# Patient Record
Sex: FEMALE | Race: WHITE | NOT HISPANIC OR LATINO | URBAN - METROPOLITAN AREA
[De-identification: names, ages, dates, MRNs, and addresses within clinical notes are randomized per-mention and may not be internally consistent; named-entity substitution may affect disease eponyms.]

---

## 2019-12-22 ENCOUNTER — INPATIENT (INPATIENT)
Facility: HOSPITAL | Age: 19
LOS: 2 days | Discharge: HOME | End: 2019-12-25
Attending: INTERNAL MEDICINE | Admitting: INTERNAL MEDICINE
Payer: COMMERCIAL

## 2019-12-22 VITALS — HEART RATE: 121 BPM | WEIGHT: 104.94 LBS | OXYGEN SATURATION: 100 % | TEMPERATURE: 98 F | RESPIRATION RATE: 24 BRPM

## 2019-12-22 LAB
HCT VFR BLD CALC: 39 % — SIGNIFICANT CHANGE UP (ref 37–47)
HGB BLD-MCNC: 12.3 G/DL — SIGNIFICANT CHANGE UP (ref 12–16)
MCHC RBC-ENTMCNC: 25.9 PG — LOW (ref 27–31)
MCHC RBC-ENTMCNC: 31.5 G/DL — LOW (ref 32–37)
MCV RBC AUTO: 82.3 FL — SIGNIFICANT CHANGE UP (ref 81–99)
NRBC # BLD: 0 /100 WBCS — SIGNIFICANT CHANGE UP (ref 0–0)
PLATELET # BLD AUTO: 195 K/UL — SIGNIFICANT CHANGE UP (ref 130–400)
RBC # BLD: 4.74 M/UL — SIGNIFICANT CHANGE UP (ref 4.2–5.4)
RBC # FLD: 13 % — SIGNIFICANT CHANGE UP (ref 11.5–14.5)
WBC # BLD: 4.36 K/UL — LOW (ref 4.8–10.8)
WBC # FLD AUTO: 4.36 K/UL — LOW (ref 4.8–10.8)

## 2019-12-22 PROCEDURE — 99291 CRITICAL CARE FIRST HOUR: CPT

## 2019-12-22 RX ORDER — IPRATROPIUM/ALBUTEROL SULFATE 18-103MCG
3 AEROSOL WITH ADAPTER (GRAM) INHALATION ONCE
Refills: 0 | Status: COMPLETED | OUTPATIENT
Start: 2019-12-22 | End: 2019-12-22

## 2019-12-22 RX ORDER — DEXAMETHASONE 0.5 MG/5ML
10 ELIXIR ORAL ONCE
Refills: 0 | Status: COMPLETED | OUTPATIENT
Start: 2019-12-22 | End: 2019-12-22

## 2019-12-22 RX ORDER — EPINEPHRINE 0.3 MG/.3ML
0.3 INJECTION INTRAMUSCULAR; SUBCUTANEOUS ONCE
Refills: 0 | Status: DISCONTINUED | OUTPATIENT
Start: 2019-12-22 | End: 2019-12-23

## 2019-12-23 LAB
ALBUMIN SERPL ELPH-MCNC: 4.6 G/DL — SIGNIFICANT CHANGE UP (ref 3.5–5.2)
ALP SERPL-CCNC: 52 U/L — SIGNIFICANT CHANGE UP (ref 30–115)
ALT FLD-CCNC: 10 U/L — LOW (ref 14–37)
AMPHET UR-MCNC: NEGATIVE — SIGNIFICANT CHANGE UP
ANION GAP SERPL CALC-SCNC: 12 MMOL/L — SIGNIFICANT CHANGE UP (ref 7–14)
ANION GAP SERPL CALC-SCNC: 13 MMOL/L — SIGNIFICANT CHANGE UP (ref 7–14)
ANION GAP SERPL CALC-SCNC: 17 MMOL/L — HIGH (ref 7–14)
AST SERPL-CCNC: 19 U/L — SIGNIFICANT CHANGE UP (ref 14–37)
BARBITURATES UR SCN-MCNC: NEGATIVE — SIGNIFICANT CHANGE UP
BASOPHILS # BLD AUTO: 0 K/UL — SIGNIFICANT CHANGE UP (ref 0–0.2)
BASOPHILS # BLD AUTO: 0 K/UL — SIGNIFICANT CHANGE UP (ref 0–0.2)
BASOPHILS NFR BLD AUTO: 0 % — SIGNIFICANT CHANGE UP (ref 0–1)
BASOPHILS NFR BLD AUTO: 0 % — SIGNIFICANT CHANGE UP (ref 0–1)
BENZODIAZ UR-MCNC: POSITIVE
BILIRUB SERPL-MCNC: 0.3 MG/DL — SIGNIFICANT CHANGE UP (ref 0.2–1.2)
BUN SERPL-MCNC: 5 MG/DL — LOW (ref 10–20)
BUN SERPL-MCNC: 5 MG/DL — LOW (ref 10–20)
BUN SERPL-MCNC: 7 MG/DL — LOW (ref 10–20)
BURR CELLS BLD QL SMEAR: PRESENT — SIGNIFICANT CHANGE UP
CA-I SERPL-SCNC: 1.15 MMOL/L — SIGNIFICANT CHANGE UP (ref 1.12–1.3)
CALCIUM SERPL-MCNC: 7.9 MG/DL — LOW (ref 8.5–10.1)
CALCIUM SERPL-MCNC: 8.2 MG/DL — LOW (ref 8.5–10.1)
CALCIUM SERPL-MCNC: 9.2 MG/DL — SIGNIFICANT CHANGE UP (ref 8.5–10.1)
CHLORIDE SERPL-SCNC: 103 MMOL/L — SIGNIFICANT CHANGE UP (ref 98–110)
CHLORIDE SERPL-SCNC: 108 MMOL/L — SIGNIFICANT CHANGE UP (ref 98–110)
CHLORIDE SERPL-SCNC: 98 MMOL/L — SIGNIFICANT CHANGE UP (ref 98–110)
CO2 SERPL-SCNC: 19 MMOL/L — SIGNIFICANT CHANGE UP (ref 17–32)
CO2 SERPL-SCNC: 20 MMOL/L — SIGNIFICANT CHANGE UP (ref 17–32)
CO2 SERPL-SCNC: 20 MMOL/L — SIGNIFICANT CHANGE UP (ref 17–32)
COCAINE METAB.OTHER UR-MCNC: NEGATIVE — SIGNIFICANT CHANGE UP
CREAT SERPL-MCNC: 0.5 MG/DL — LOW (ref 0.7–1.5)
CREAT SERPL-MCNC: 0.6 MG/DL — LOW (ref 0.7–1.5)
CREAT SERPL-MCNC: 0.6 MG/DL — LOW (ref 0.7–1.5)
ELLIPTOCYTES BLD QL SMEAR: SLIGHT — SIGNIFICANT CHANGE UP
EOSINOPHIL # BLD AUTO: 0 K/UL — SIGNIFICANT CHANGE UP (ref 0–0.7)
EOSINOPHIL NFR BLD AUTO: 0 % — SIGNIFICANT CHANGE UP (ref 0–8)
EOSINOPHIL NFR BLD AUTO: 0 % — SIGNIFICANT CHANGE UP (ref 0–8)
GAS PNL BLDA: SIGNIFICANT CHANGE UP
GAS PNL BLDV: 140 MMOL/L — SIGNIFICANT CHANGE UP (ref 136–145)
GAS PNL BLDV: SIGNIFICANT CHANGE UP
GLUCOSE SERPL-MCNC: 141 MG/DL — HIGH (ref 70–99)
GLUCOSE SERPL-MCNC: 162 MG/DL — HIGH (ref 70–99)
GLUCOSE SERPL-MCNC: 72 MG/DL — SIGNIFICANT CHANGE UP (ref 70–99)
HCG SERPL QL: NEGATIVE — SIGNIFICANT CHANGE UP
HCO3 BLDV-SCNC: 24 MMOL/L — SIGNIFICANT CHANGE UP (ref 22–29)
HCT VFR BLD CALC: 30.3 % — LOW (ref 37–47)
HCT VFR BLD CALC: 32.4 % — LOW (ref 37–47)
HCT VFR BLDA CALC: 47.4 % — HIGH (ref 34–44)
HGB BLD CALC-MCNC: 15.5 G/DL — SIGNIFICANT CHANGE UP (ref 14–18)
HGB BLD-MCNC: 10.3 G/DL — LOW (ref 12–16)
HGB BLD-MCNC: 9.4 G/DL — LOW (ref 12–16)
HOROWITZ INDEX BLDV+IHG-RTO: 21 — SIGNIFICANT CHANGE UP
IMM GRANULOCYTES NFR BLD AUTO: 0.2 % — SIGNIFICANT CHANGE UP (ref 0.1–0.3)
LACTATE BLDV-MCNC: 2.5 MMOL/L — HIGH (ref 0.5–1.6)
LYMPHOCYTES # BLD AUTO: 0.32 K/UL — LOW (ref 1.2–3.4)
LYMPHOCYTES # BLD AUTO: 0.49 K/UL — LOW (ref 1.2–3.4)
LYMPHOCYTES # BLD AUTO: 10 % — LOW (ref 20.5–51.1)
LYMPHOCYTES # BLD AUTO: 12.1 % — LOW (ref 20.5–51.1)
MAGNESIUM SERPL-MCNC: 1.6 MG/DL — LOW (ref 1.8–2.4)
MCHC RBC-ENTMCNC: 26 PG — LOW (ref 27–31)
MCHC RBC-ENTMCNC: 26.3 PG — LOW (ref 27–31)
MCHC RBC-ENTMCNC: 31 G/DL — LOW (ref 32–37)
MCHC RBC-ENTMCNC: 31.8 G/DL — LOW (ref 32–37)
MCV RBC AUTO: 82.7 FL — SIGNIFICANT CHANGE UP (ref 81–99)
MCV RBC AUTO: 83.7 FL — SIGNIFICANT CHANGE UP (ref 81–99)
METHADONE UR-MCNC: NEGATIVE — SIGNIFICANT CHANGE UP
MONOCYTES # BLD AUTO: 0.1 K/UL — SIGNIFICANT CHANGE UP (ref 0.1–0.6)
MONOCYTES # BLD AUTO: 0.21 K/UL — SIGNIFICANT CHANGE UP (ref 0.1–0.6)
MONOCYTES NFR BLD AUTO: 3 % — SIGNIFICANT CHANGE UP (ref 1.7–9.3)
MONOCYTES NFR BLD AUTO: 5.2 % — SIGNIFICANT CHANGE UP (ref 1.7–9.3)
NEUTROPHILS # BLD AUTO: 2.77 K/UL — SIGNIFICANT CHANGE UP (ref 1.4–6.5)
NEUTROPHILS # BLD AUTO: 3.35 K/UL — SIGNIFICANT CHANGE UP (ref 1.4–6.5)
NEUTROPHILS NFR BLD AUTO: 61 % — SIGNIFICANT CHANGE UP (ref 42.2–75.2)
NEUTROPHILS NFR BLD AUTO: 82.5 % — HIGH (ref 42.2–75.2)
NEUTS BAND # BLD: 25 % — HIGH (ref 0–6)
NRBC # BLD: 0 /100 WBCS — SIGNIFICANT CHANGE UP (ref 0–0)
NRBC # BLD: 0 /100 — SIGNIFICANT CHANGE UP (ref 0–0)
NRBC # BLD: SIGNIFICANT CHANGE UP /100 WBCS (ref 0–0)
OPIATES UR-MCNC: NEGATIVE — SIGNIFICANT CHANGE UP
OSMOLALITY SERPL: 310 MOSMOL/KG — HIGH (ref 275–300)
PCO2 BLDV: 38 MMHG — LOW (ref 41–51)
PCP SPEC-MCNC: SIGNIFICANT CHANGE UP
PH BLDV: 7.42 — SIGNIFICANT CHANGE UP (ref 7.26–7.43)
PHOSPHATE SERPL-MCNC: 2.6 MG/DL — SIGNIFICANT CHANGE UP (ref 2.1–4.9)
PLAT MORPH BLD: NORMAL — SIGNIFICANT CHANGE UP
PLATELET # BLD AUTO: 136 K/UL — SIGNIFICANT CHANGE UP (ref 130–400)
PLATELET # BLD AUTO: 147 K/UL — SIGNIFICANT CHANGE UP (ref 130–400)
PO2 BLDV: 14 MMHG — LOW (ref 20–40)
POTASSIUM BLDV-SCNC: 3.2 MMOL/L — LOW (ref 3.3–5.6)
POTASSIUM SERPL-MCNC: 3.3 MMOL/L — LOW (ref 3.5–5)
POTASSIUM SERPL-MCNC: 3.5 MMOL/L — SIGNIFICANT CHANGE UP (ref 3.5–5)
POTASSIUM SERPL-MCNC: 4 MMOL/L — SIGNIFICANT CHANGE UP (ref 3.5–5)
POTASSIUM SERPL-SCNC: 3.3 MMOL/L — LOW (ref 3.5–5)
POTASSIUM SERPL-SCNC: 3.5 MMOL/L — SIGNIFICANT CHANGE UP (ref 3.5–5)
POTASSIUM SERPL-SCNC: 4 MMOL/L — SIGNIFICANT CHANGE UP (ref 3.5–5)
PROPOXYPHENE QUALITATIVE URINE RESULT: NEGATIVE — SIGNIFICANT CHANGE UP
PROT SERPL-MCNC: 7 G/DL — SIGNIFICANT CHANGE UP (ref 6–8)
RBC # BLD: 3.62 M/UL — LOW (ref 4.2–5.4)
RBC # BLD: 3.92 M/UL — LOW (ref 4.2–5.4)
RBC # FLD: 13.1 % — SIGNIFICANT CHANGE UP (ref 11.5–14.5)
RBC # FLD: 13.2 % — SIGNIFICANT CHANGE UP (ref 11.5–14.5)
RBC BLD AUTO: ABNORMAL
SAO2 % BLDV: 17 % — SIGNIFICANT CHANGE UP
SODIUM SERPL-SCNC: 135 MMOL/L — SIGNIFICANT CHANGE UP (ref 135–146)
SODIUM SERPL-SCNC: 136 MMOL/L — SIGNIFICANT CHANGE UP (ref 135–146)
SODIUM SERPL-SCNC: 139 MMOL/L — SIGNIFICANT CHANGE UP (ref 135–146)
TROPONIN T SERPL-MCNC: <0.01 NG/ML — SIGNIFICANT CHANGE UP
VARIANT LYMPHS # BLD: 1 % — SIGNIFICANT CHANGE UP (ref 0–5)
WBC # BLD: 3.22 K/UL — LOW (ref 4.8–10.8)
WBC # BLD: 4.06 K/UL — LOW (ref 4.8–10.8)
WBC # FLD AUTO: 3.22 K/UL — LOW (ref 4.8–10.8)
WBC # FLD AUTO: 4.06 K/UL — LOW (ref 4.8–10.8)

## 2019-12-23 PROCEDURE — 70491 CT SOFT TISSUE NECK W/DYE: CPT | Mod: 26

## 2019-12-23 PROCEDURE — 99233 SBSQ HOSP IP/OBS HIGH 50: CPT

## 2019-12-23 PROCEDURE — 71045 X-RAY EXAM CHEST 1 VIEW: CPT | Mod: 26

## 2019-12-23 PROCEDURE — 71275 CT ANGIOGRAPHY CHEST: CPT | Mod: 26

## 2019-12-23 PROCEDURE — 99222 1ST HOSP IP/OBS MODERATE 55: CPT

## 2019-12-23 RX ORDER — CEFTRIAXONE 500 MG/1
1000 INJECTION, POWDER, FOR SOLUTION INTRAMUSCULAR; INTRAVENOUS EVERY 24 HOURS
Refills: 0 | Status: DISCONTINUED | OUTPATIENT
Start: 2019-12-23 | End: 2019-12-23

## 2019-12-23 RX ORDER — PROPOFOL 10 MG/ML
10 INJECTION, EMULSION INTRAVENOUS
Qty: 1000 | Refills: 0 | Status: DISCONTINUED | OUTPATIENT
Start: 2019-12-23 | End: 2019-12-24

## 2019-12-23 RX ORDER — CHLORHEXIDINE GLUCONATE 213 G/1000ML
1 SOLUTION TOPICAL
Refills: 0 | Status: DISCONTINUED | OUTPATIENT
Start: 2019-12-23 | End: 2019-12-25

## 2019-12-23 RX ORDER — POTASSIUM CHLORIDE 20 MEQ
20 PACKET (EA) ORAL ONCE
Refills: 0 | Status: COMPLETED | OUTPATIENT
Start: 2019-12-23 | End: 2019-12-23

## 2019-12-23 RX ORDER — SODIUM CHLORIDE 9 MG/ML
1000 INJECTION INTRAMUSCULAR; INTRAVENOUS; SUBCUTANEOUS
Refills: 0 | Status: DISCONTINUED | OUTPATIENT
Start: 2019-12-23 | End: 2019-12-24

## 2019-12-23 RX ORDER — FAMOTIDINE 10 MG/ML
20 INJECTION INTRAVENOUS DAILY
Refills: 0 | Status: DISCONTINUED | OUTPATIENT
Start: 2019-12-23 | End: 2019-12-23

## 2019-12-23 RX ORDER — VANCOMYCIN HCL 1 G
750 VIAL (EA) INTRAVENOUS EVERY 12 HOURS
Refills: 0 | Status: DISCONTINUED | OUTPATIENT
Start: 2019-12-23 | End: 2019-12-23

## 2019-12-23 RX ORDER — VANCOMYCIN HCL 1 G
1000 VIAL (EA) INTRAVENOUS ONCE
Refills: 0 | Status: COMPLETED | OUTPATIENT
Start: 2019-12-23 | End: 2019-12-23

## 2019-12-23 RX ORDER — ENOXAPARIN SODIUM 100 MG/ML
40 INJECTION SUBCUTANEOUS DAILY
Refills: 0 | Status: DISCONTINUED | OUTPATIENT
Start: 2019-12-23 | End: 2019-12-23

## 2019-12-23 RX ORDER — FENTANYL CITRATE 50 UG/ML
0.5 INJECTION INTRAVENOUS
Qty: 2500 | Refills: 0 | Status: DISCONTINUED | OUTPATIENT
Start: 2019-12-23 | End: 2019-12-24

## 2019-12-23 RX ORDER — SODIUM CHLORIDE 9 MG/ML
1000 INJECTION INTRAMUSCULAR; INTRAVENOUS; SUBCUTANEOUS
Refills: 0 | Status: DISCONTINUED | OUTPATIENT
Start: 2019-12-23 | End: 2019-12-23

## 2019-12-23 RX ORDER — CHLORHEXIDINE GLUCONATE 213 G/1000ML
15 SOLUTION TOPICAL
Refills: 0 | Status: DISCONTINUED | OUTPATIENT
Start: 2019-12-23 | End: 2019-12-23

## 2019-12-23 RX ORDER — CHLORHEXIDINE GLUCONATE 213 G/1000ML
1 SOLUTION TOPICAL
Refills: 0 | Status: DISCONTINUED | OUTPATIENT
Start: 2019-12-23 | End: 2019-12-23

## 2019-12-23 RX ORDER — CHLORHEXIDINE GLUCONATE 213 G/1000ML
15 SOLUTION TOPICAL
Refills: 0 | Status: DISCONTINUED | OUTPATIENT
Start: 2019-12-23 | End: 2019-12-25

## 2019-12-23 RX ORDER — FAMOTIDINE 10 MG/ML
20 INJECTION INTRAVENOUS DAILY
Refills: 0 | Status: DISCONTINUED | OUTPATIENT
Start: 2019-12-23 | End: 2019-12-25

## 2019-12-23 RX ORDER — DIPHENHYDRAMINE HCL 50 MG
50 CAPSULE ORAL
Refills: 0 | Status: DISCONTINUED | OUTPATIENT
Start: 2019-12-23 | End: 2019-12-23

## 2019-12-23 RX ORDER — DIPHENHYDRAMINE HCL 50 MG
50 CAPSULE ORAL
Refills: 0 | Status: DISCONTINUED | OUTPATIENT
Start: 2019-12-23 | End: 2019-12-25

## 2019-12-23 RX ORDER — CEFTRIAXONE 500 MG/1
1000 INJECTION, POWDER, FOR SOLUTION INTRAMUSCULAR; INTRAVENOUS EVERY 24 HOURS
Refills: 0 | Status: DISCONTINUED | OUTPATIENT
Start: 2019-12-23 | End: 2019-12-25

## 2019-12-23 RX ORDER — PROPOFOL 10 MG/ML
10 INJECTION, EMULSION INTRAVENOUS
Qty: 1000 | Refills: 0 | Status: DISCONTINUED | OUTPATIENT
Start: 2019-12-23 | End: 2019-12-23

## 2019-12-23 RX ORDER — POTASSIUM CHLORIDE 20 MEQ
10 PACKET (EA) ORAL ONCE
Refills: 0 | Status: DISCONTINUED | OUTPATIENT
Start: 2019-12-23 | End: 2019-12-23

## 2019-12-23 RX ORDER — PANTOPRAZOLE SODIUM 20 MG/1
40 TABLET, DELAYED RELEASE ORAL DAILY
Refills: 0 | Status: DISCONTINUED | OUTPATIENT
Start: 2019-12-23 | End: 2019-12-23

## 2019-12-23 RX ORDER — ENOXAPARIN SODIUM 100 MG/ML
40 INJECTION SUBCUTANEOUS DAILY
Refills: 0 | Status: DISCONTINUED | OUTPATIENT
Start: 2019-12-23 | End: 2019-12-25

## 2019-12-23 RX ORDER — SODIUM CHLORIDE 9 MG/ML
1000 INJECTION INTRAMUSCULAR; INTRAVENOUS; SUBCUTANEOUS ONCE
Refills: 0 | Status: COMPLETED | OUTPATIENT
Start: 2019-12-23 | End: 2019-12-23

## 2019-12-23 RX ORDER — MAGNESIUM SULFATE 500 MG/ML
2 VIAL (ML) INJECTION ONCE
Refills: 0 | Status: COMPLETED | OUTPATIENT
Start: 2019-12-23 | End: 2019-12-23

## 2019-12-23 RX ADMIN — Medication 50 MILLIEQUIVALENT(S): at 12:12

## 2019-12-23 RX ADMIN — Medication 50 GRAM(S): at 11:45

## 2019-12-23 RX ADMIN — SODIUM CHLORIDE 50 MILLILITER(S): 9 INJECTION INTRAMUSCULAR; INTRAVENOUS; SUBCUTANEOUS at 03:19

## 2019-12-23 RX ADMIN — Medication 50 MILLIGRAM(S): at 06:19

## 2019-12-23 RX ADMIN — ENOXAPARIN SODIUM 40 MILLIGRAM(S): 100 INJECTION SUBCUTANEOUS at 13:18

## 2019-12-23 RX ADMIN — FAMOTIDINE 20 MILLIGRAM(S): 10 INJECTION INTRAVENOUS at 13:28

## 2019-12-23 RX ADMIN — CHLORHEXIDINE GLUCONATE 1 APPLICATION(S): 213 SOLUTION TOPICAL at 18:03

## 2019-12-23 RX ADMIN — Medication 50 MILLIGRAM(S): at 17:56

## 2019-12-23 RX ADMIN — Medication 60 MILLIGRAM(S): at 17:56

## 2019-12-23 RX ADMIN — Medication 3 MILLILITER(S): at 00:57

## 2019-12-23 RX ADMIN — FENTANYL CITRATE 2.38 MICROGRAM(S)/KG/HR: 50 INJECTION INTRAVENOUS at 04:48

## 2019-12-23 RX ADMIN — CEFTRIAXONE 100 MILLIGRAM(S): 500 INJECTION, POWDER, FOR SOLUTION INTRAMUSCULAR; INTRAVENOUS at 06:35

## 2019-12-23 RX ADMIN — CHLORHEXIDINE GLUCONATE 15 MILLILITER(S): 213 SOLUTION TOPICAL at 18:03

## 2019-12-23 RX ADMIN — CHLORHEXIDINE GLUCONATE 1 APPLICATION(S): 213 SOLUTION TOPICAL at 06:20

## 2019-12-23 RX ADMIN — PROPOFOL 2.86 MICROGRAM(S)/KG/MIN: 10 INJECTION, EMULSION INTRAVENOUS at 07:00

## 2019-12-23 RX ADMIN — Medication 250 MILLIGRAM(S): at 01:46

## 2019-12-23 RX ADMIN — SODIUM CHLORIDE 1000 MILLILITER(S): 9 INJECTION INTRAMUSCULAR; INTRAVENOUS; SUBCUTANEOUS at 01:47

## 2019-12-23 RX ADMIN — PROPOFOL 2.86 MICROGRAM(S)/KG/MIN: 10 INJECTION, EMULSION INTRAVENOUS at 03:18

## 2019-12-23 RX ADMIN — CHLORHEXIDINE GLUCONATE 15 MILLILITER(S): 213 SOLUTION TOPICAL at 06:38

## 2019-12-23 RX ADMIN — SODIUM CHLORIDE 50 MILLILITER(S): 9 INJECTION INTRAMUSCULAR; INTRAVENOUS; SUBCUTANEOUS at 07:00

## 2019-12-23 RX ADMIN — Medication 60 MILLIGRAM(S): at 06:18

## 2019-12-23 RX ADMIN — FENTANYL CITRATE 2.38 MICROGRAM(S)/KG/HR: 50 INJECTION INTRAVENOUS at 07:00

## 2019-12-23 RX ADMIN — Medication 102 MILLIGRAM(S): at 00:57

## 2019-12-23 NOTE — PROGRESS NOTE ADULT - ASSESSMENT
19 yr old healthy female presented to ER for acute onset of sob.    # Acute respiratory failure secondary to severe glottic/subglottic narrowing  - s/p intubation in ER   - c/w NS@75  - c/w solumedrol 60mg q12  - c/w diphenhydramine  - d/c ceftriaxone and vancomycin  - check urine toxicology  - f/u blood cultures  - SAT tomorrow    # DVT Ppx  - c/w Lovenox    # GI ppx  - c/w famotidine    # NPO for now    # Bedrest     # Deposition  - from home    # Full code 19 yr old healthy female presented to ER for acute onset of sob.    # Acute respiratory failure secondary to severe glottic/subglottic narrowing  - s/p intubation in ER   - c/w fentanyl and propofol  - c/w NS@75  - c/w solumedrol 60mg q12  - c/w diphenhydramine  - d/c ceftriaxone and vancomycin  - check urine toxicology  - f/u blood cultures  - SAT tomorrow    # DVT Ppx  - c/w Lovenox    # GI ppx  - c/w famotidine    # NPO for now    # Bedrest     # Deposition  - from home    # Full code 19 yr old healthy female presented to ER for acute onset of sob.    # Acute respiratory failure secondary to severe glottic/subglottic narrowing  - s/p intubation in ER   - c/w fentanyl and propofol  - c/w NS@75  - c/w solumedrol 60mg q12  - c/w diphenhydramine  - d/c ceftriaxone and vancomycin  - check urine toxicology  - f/u blood cultures  - SAT tomorrow    # DVT Ppx  - c/w Lovenox    # GI ppx  - c/w famotidine    # NPO for now    # Bedrest     # Deposition  - from home    # Full code    Attending Attestation    Pt has been seen and examined. Case and Plan discussed at rounds with family and resident covering. Chart reviewed.   Pt is admitted for Subglottic / Glottic Edema from unclear etiology and ETT for Airway Protection.  She is being treated with IV Solumedrol, Benadryl, Pepcid and on light sedation.   c/w ICU care and current plan.  Pt will need outpt Allergy Clinic f/u for Allergy Testing.   DVT proph     Dispo: Acute

## 2019-12-23 NOTE — H&P ADULT - NSHPLABSRESULTS_GEN_ALL_CORE
I&O's Detail        LABS:                        12.3   4.36  )-----------( 195      ( 22 Dec 2019 23:45 )             39.0     22 Dec 2019 23:45    135    |  98     |  7      ----------------------------<  72     3.3     |  20     |  0.6      Ca    9.2        22 Dec 2019 23:45    TPro  7.0    /  Alb  4.6    /  TBili  0.3    /  DBili  x      /  AST  19     /  ALT  10     /  AlkPhos  52     22 Dec 2019 23:45  Amylase x     lipase x          CARDIAC MARKERS ( 22 Dec 2019 23:45 )  x     / <0.01 ng/mL / x     / x     / x          CAPILLARY BLOOD GLUCOSE            Culture        MEDICATIONS  (STANDING):  sodium chloride 0.9% Bolus 1000 milliLiter(s) IV Bolus once  vancomycin  IVPB 1000 milliGRAM(s) IV Intermittent once    MEDICATIONS  (PRN):        RADIOLOGY:     < from: CT Neck Soft Tissue w/ IV Cont (12.23.19 @ 00:27) >    IMPRESSION:     Near complete obliteration of the airway at the level of the epiglottis secondary to epiglottic edema. Findings consistent with epiglottitis.      < end of copied text >

## 2019-12-23 NOTE — ED PROVIDER NOTE - PROGRESS NOTE DETAILS
D/w Radiologist - states that patient has near complete air obliteration of airway - epiglottitis D/w Anesthesia - Dr. Alyse Griffin [facility]. D/w Surgery HAIR Orellana aware of epiglottis - Spoke with Surgeon Dr. Chinchilla D/w Dr. Draper of ICU aware and accepts to ICU

## 2019-12-23 NOTE — ED PROVIDER NOTE - CRITICAL CARE PROVIDED
direct patient care (not related to procedure)/interpretation of diagnostic studies/documentation/consult w/ pt's family directly relating to pts condition/additional history taking/consultation with other physicians
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
## 2019-12-23 NOTE — PROGRESS NOTE ADULT - SUBJECTIVE AND OBJECTIVE BOX
SUBJECTIVE:    Patient is a 19y old Female who presents with a chief complaint of Epiglottitis (23 Dec 2019 09:11)    Currently admitted to medicine with the primary diagnosis of Epiglottitis     Today is hospital day . This morning she is resting comfortably in bed, sedated but easily arousable. She communicates with writing. States she feels ok, no complains today.      PAST MEDICAL & SURGICAL HISTORY  No pertinent past medical history  No significant past surgical history    SOCIAL HISTORY:  Negative for smoking/alcohol/drug use.     ALLERGIES:  No Known Allergies    MEDICATIONS:  STANDING MEDICATIONS  chlorhexidine 0.12% Liquid 15 milliLiter(s) Oral Mucosa two times a day  chlorhexidine 4% Liquid 1 Application(s) Topical two times a day  diphenhydrAMINE   Injectable 50 milliGRAM(s) IV Push two times a day  enoxaparin Injectable 40 milliGRAM(s) SubCutaneous daily  famotidine Injectable 20 milliGRAM(s) IV Push daily  fentaNYL   Infusion. 0.5 MICROgram(s)/kG/Hr IV Continuous <Continuous>  methylPREDNISolone sodium succinate Injectable 60 milliGRAM(s) IV Push two times a day  propofol Infusion 10 MICROgram(s)/kG/Min IV Continuous <Continuous>  sodium chloride 0.9%. 1000 milliLiter(s) IV Continuous <Continuous>    PRN MEDICATIONS    VITALS:   T(F): 96.7  HR: 81  BP: 88/51  RR: 15  SpO2: 100%    LABS:                        9.4    3.22  )-----------( 136      ( 23 Dec 2019 05:53 )             30.3     12-23    139  |  108  |  5<L>  ----------------------------<  162<H>  3.5   |  19  |  0.6<L>    Ca    7.9<L>      23 Dec 2019 05:53  Phos  2.6     12-23  Mg     1.6     12-23    TPro  7.0  /  Alb  4.6  /  TBili  0.3  /  DBili  x   /  AST  19  /  ALT  10<L>  /  AlkPhos  52  12-22        ABG - ( 23 Dec 2019 04:16 )  pH, Arterial: 7.31  pH, Blood: x     /  pCO2: 37    /  pO2: 207   / HCO3: 19    / Base Excess: -6.6  /  SaO2: 100         Troponin T, Serum: <0.01 ng/mL (12-22-19 @ 23:45)      CARDIAC MARKERS ( 22 Dec 2019 23:45 )  x     / <0.01 ng/mL / x     / x     / x          RADIOLOGY:  CT Neck Soft Tissue w/ IV Cont (12.23.19):  Thickening of the vocal cords and aryepiglottic folds which can reflect edema/inflammation with apparent glottic/subglottic level severe airway narrowing - can sometimes be a transient finding but correlation with examinationis needed. Tracheal debris is noted immediately distal to the level of narrowing.    PHYSICAL EXAM:  GEN: No acute distress, intubated  LUNGS: Clear to auscultation bilaterally   HEART: S1/S2 present. RRR.   ABD: Soft, non-tender, non-distended. Bowel sounds present  EXT: NC/NC/NE/2+PP/LEACH  NEURO: sedated

## 2019-12-23 NOTE — H&P ADULT - NSHPPHYSICALEXAM_GEN_ALL_CORE
ICU Vital Signs Last 24 Hrs  T(C): 37 (23 Dec 2019 01:34), Max: 37 (23 Dec 2019 01:34)  T(F): 98.6 (23 Dec 2019 01:34), Max: 98.6 (23 Dec 2019 01:34)  HR: 91 (23 Dec 2019 01:34) (91 - 121)  BP: 121/72 (23 Dec 2019 01:34) (121/72 - 121/72)  BP(mean): --  ABP: --  ABP(mean): --  RR: 22 (23 Dec 2019 01:34) (22 - 24)  SpO2: 100% (23 Dec 2019 01:34) (100% - 100%)        Physical Examination:    General: No acute distress.  Alert, oriented, interactive, nonfocal    HEENT: Pupils equal, reactive to light.  Symmetric.    PULM: Clear to auscultation bilaterally, no significant sputum production    CVS: Regular rate and rhythm, no murmurs, rubs, or gallops    ABD: Soft, nondistended, nontender, normoactive bowel sounds, no masses    EXT: No edema, nontender    SKIN: Warm and well perfused, no rashes noted. ICU Vital Signs Last 24 Hrs  T(C): 37 (23 Dec 2019 01:34), Max: 37 (23 Dec 2019 01:34)  T(F): 98.6 (23 Dec 2019 01:34), Max: 98.6 (23 Dec 2019 01:34)  HR: 91 (23 Dec 2019 01:34) (91 - 121)  BP: 121/72 (23 Dec 2019 01:34) (121/72 - 121/72)  BP(mean): --  ABP: --  ABP(mean): --  RR: 22 (23 Dec 2019 01:34) (22 - 24)  SpO2: 100% (23 Dec 2019 01:34) (100% - 100%)        Physical Examination:    General: No acute distress. Getting nebulizer txt comfortably on the bed. Alert, oriented, interactive, nonfocal    HEENT: Pupils equal, reactive to light.  Symmetric.    PULM: + decreased breath sounds b/l    CVS: Regular rate and rhythm, no murmurs, rubs, or gallops    ABD: Soft, nondistended, nontender, normoactive bowel sounds, no masses    EXT: No edema, nontender    SKIN: Warm and well perfused, no rashes noted.    Neuro: AAO x 4

## 2019-12-23 NOTE — CONSULT NOTE ADULT - ASSESSMENT
AIRWAY NARROWING /OBSTRUCTION  Thickening of the vocal cords and aryepiglottic folds which can reflect edema/inflammation with apparent glottic/subglottic level severe airway narrowing   FOR  INTUBATION  SURGERY-   DR MCKEON  CALLED IN/ STANDBY  FOR  POSSIBLE TRACHEOSTOMY   WILL FOLLOW

## 2019-12-23 NOTE — ED PROVIDER NOTE - CLINICAL SUMMARY MEDICAL DECISION MAKING FREE TEXT BOX
Pt with near-complete occlusion of the airway. To go to OR for intubated with surgery on standby. Will admit.

## 2019-12-23 NOTE — AIRWAY PLACEMENT NOTE ADULT - POST AIRWAY PLACEMENT ASSESSMENT:
chest excursion noted/CXR pending/breath sounds bilateral/breath sounds equal/positive end tidal CO2 noted
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
## 2019-12-23 NOTE — CHART NOTE - NSCHARTNOTEFT_GEN_A_CORE
12/23/  Patient came in for airway edema overnight. Did a cuff deflation test, pt lost all volume. Upon auscultation there is positive air movement around the cuff. Pt currently comfortable. MD Driscoll aware, he requests give patient 1 additional day and re-evaluate tomorrow.     -Jena Giles, RRT
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
## 2019-12-23 NOTE — PRE-ANESTHESIA EVALUATION ADULT - NSANTHOSAYNRD_GEN_A_CORE
No. FRANKLYN screening performed.  STOP BANG Legend: 0-2 = LOW Risk; 3-4 = INTERMEDIATE Risk; 5-8 = HIGH Risk
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
## 2019-12-23 NOTE — CONSULT NOTE ADULT - ASSESSMENT
IMPRESSION:  Acute respiratory failure - severe glottic/subglottic narrowing  S/P intubation in OR       PLAN:    CNS: Spontaneous awakening trial    HEENT: Oral care    PULMONARY:  HOB @ 45 degrees.  Vent changes as follows:     CARDIOVASCULAR:    GI: GI prophylaxis.  Feeding     RENAL:  Follow up lytes.  Correct as needed    INFECTIOUS DISEASE: Follow up cultures    HEMATOLOGICAL:  DVT prophylaxis.    ENDOCRINE:  Follow up FS.  Insulin protocol if needed.    MUSCULOSKELETAL: IMPRESSION:  Acute respiratory failure - severe glottic/subglottic narrowing/swelling  no epiglottitis  S/P intubation in OR   mild metabolic acidosis      SUGGEST:    CNS: Spontaneous awakening trial in AM    HEENT: Oral care    PULMONARY:  HOB @ 45 degrees.  Vent changes as follows: taper O2 as tolerated    CARDIOVASCULAR: monitor I/O    GI: GI prophylaxis.  Feeding     RENAL:  Follow up lytes.  Correct as needed  serum Osms    INFECTIOUS DISEASE: Follow up cultures  cont coverage for strep  viral panel    HEMATOLOGICAL:  DVT prophylaxis.    ENDOCRINE:  Follow up FS.  Insulin protocol if needed.    MUSCULOSKELETAL:

## 2019-12-23 NOTE — CONSULT NOTE ADULT - SUBJECTIVE AND OBJECTIVE BOX
19 y.o. female comes in c/o SOB which started while she was driving. Pt has been having cough and fever for last couple of days. When driving today was coughing and all of a sudden felt as if can't breath/move air. No CP. No abdominal pain. No n/v/c/d. No urinary symptoms. No abdominal pain. No vaginal discharge. No travel. Pt is on birth control. On exam, pt is, AAOx3, head NC/AT, tripoding, hoarse voice,throat (-) erythema/exudates, uvula midline, lungs CTA B/L, CV S1S2 regular, abdomen soft/NT/ND/(+)BS, ext (-) edema, motor 5/5x4, sensation intact. Nebs started. Decadron given, 19 y.o. female comes in c/o SOB which started while she was driving. Pt has been having cough and fever for last couple of days. When driving today was coughing and all of a sudden felt as if can't breath/move air. No CP. No abdominal pain. No n/v/c/d. No urinary symptoms. No abdominal pain. No vaginal discharge. No travel. Pt is on birth control. On exam, pt is, AAOx3, head NC/AT, tripoding, hoarse voice,throat (-) erythema/exudates, uvula midline, lungs CTA B/L, CV S1S2 regular, abdomen soft/NT/ND/(+)BS, ext (-) edema, motor 5/5x4, sensation intact. Nebs started. Decadron given,    v/s  T 98.6,  /72, HR  91,  RR 22  O2  %  PE; AXOX3  HEENT-NCAT  HOARSE  VOICE/ UVULA  MIDLINE/TRIPODING  LUNGS  CTA  CVS  S1S2  ABD: + BS  SOFT  EXT  NO EDEMA 19 y.o. female comes in c/o SOB which started while she was driving. Pt has been having cough and fever for last couple of days. When driving today was coughing and all of a sudden felt as if can't breath/move air. No CP. No abdominal pain. No n/v/c/d. No urinary symptoms. No abdominal pain. No vaginal discharge. No travel. Pt is on birth control. On exam, pt is, AAOx3, head NC/AT, tripoding, hoarse voice,throat (-) erythema/exudates, uvula midline, lungs CTA B/L, CV S1S2 regular, abdomen soft/NT/ND/(+)BS, ext (-) edema, motor 5/5x4, sensation intact. Nebs started. Decadron given,    v/s  T 98.6,  /72, HR  91,  RR 22  O2  %  PE; AXOX3  HEENT-NCAT  HOARSE  VOICE/ UVULA  MIDLINE/TRIPODING  LUNGS  CTA  CVS  S1S2  ABD: + BS  SOFT  EXT  NO EDEMA                        12.3   4.36  )-----------( 195      ( 22 Dec 2019 23:45 )             39.0   12-22    135  |  98  |  7<L>  ----------------------------<  72  3.3<L>   |  20  |  0.6<L>    Ca    9.2      22 Dec 2019 23:45    TPro  7.0  /  Alb  4.6  /  TBili  0.3  /  DBili  x   /  AST  19  /  ALT  10<L>  /  AlkPhos  52  12-22  Blood Gas Venous - Hemoglobin/Hematocrit (12.23.19 @ 00:12)    Total Hemoglobin, Calculated: 15.5 g/dL    Hematocrit, Calculated: 47.4 %  Blood Gas Profile - Venous (12.23.19 @ 00:12)    pH, Venous: 7.42    pCO2, Venous: 38 mmHg    pO2, Venous: 14 mmHg    HCO3, Venous: 24 mmoL/L    Oxygen Saturation, Venous: 17 %    FIO2, Venous: 21    EXAM:  CT NECK SOFT TISSUE IC            PROCEDURE DATE:  12/23/2019            INTERPRETATION:  Clinical History / Reason for exam: Respiratory distress and desaturation.    TECHNIQUE:  CT neck with contrast. Multiple CT axial images of the neck obtained following administration of 100 cc Optiray-320 intravenous contrast with coronal and sagittal re-formations.      COMPARISON: None available    FINDINGS:    The visualized brain parenchyma is unremarkable.    The paranasal sinuses are well-aerated.  Mastoid air cells are well aerated.     Globes and orbits are unremarkable.    The parotid and submandibular glands are symmetric and normal in attenuation.      There is thickening of the vocal cords and aryepiglottic folds which can reflect edema/inflammation with apparent glottic/subglottic level severe airway narrowing, which can sometimes be a transient finding. Debris is also noted at the level of narrowing. The epiglottis appears unremarkable.    The thyroid gland is unremarkable.    The visualized lung apices demonstrate no focal consolidation.      The visualized osseous structures are unremarkable.    IMPRESSION:     Dr. Chaya Jacobs discussed preliminary findings with SUNG PERRIN PA on 12/23/2019 12:27 AM with readback.    Updated interpretation: Thickening of the vocal cords and aryepiglottic folds which can reflect edema/inflammation with apparent glottic/subglottic level severe airway narrowing - can sometimes be a transient finding but correlation with examinationis needed. Tracheal debris is noted immediately distal to the level of narrowing.              CHAYA JACOBS M.D., RESIDENT RADIOLOGIST  This document has been electronically signed.  PERI DICKSON M.D., ATTENDING RADIOLOGIST  This document has been electronically signed. Dec 23 2019  1:54AM 19 y.o. female comes in c/o SOB which started while she was driving. Pt has been having cough and fever for last couple of days. When driving today was coughing and all of a sudden felt as if can't breath/move air. No CP. No abdominal pain. No n/v/c/d. No urinary symptoms. No abdominal pain. No vaginal discharge. No travel. Pt is on birth control. On exam, pt is, AAOx3, head NC/AT, tripoding, hoarse voice,throat (-) erythema/exudates, uvula midline, lungs CTA B/L, CV S1S2 regular, abdomen soft/NT/ND/(+)BS, ext (-) edema, motor 5/5x4, sensation intact. Nebs started. Decadron given . ,For  intubation/ POSSIBLE TRACHEOSTOMY    v/s  T 98.6,  /72, HR  91,  RR 22  O2  %  PE; AXOX3  HEENT-NCAT  HOARSE  VOICE/ UVULA  MIDLINE/TRIPODING  LUNGS  CTA  CVS  S1S2  ABD: + BS  SOFT  EXT  NO EDEMA                        12.3   4.36  )-----------( 195      ( 22 Dec 2019 23:45 )             39.0   12-22    135  |  98  |  7<L>  ----------------------------<  72  3.3<L>   |  20  |  0.6<L>    Ca    9.2      22 Dec 2019 23:45    TPro  7.0  /  Alb  4.6  /  TBili  0.3  /  DBili  x   /  AST  19  /  ALT  10<L>  /  AlkPhos  52  12-22  Blood Gas Venous - Hemoglobin/Hematocrit (12.23.19 @ 00:12)    Total Hemoglobin, Calculated: 15.5 g/dL    Hematocrit, Calculated: 47.4 %  Blood Gas Profile - Venous (12.23.19 @ 00:12)    pH, Venous: 7.42    pCO2, Venous: 38 mmHg    pO2, Venous: 14 mmHg    HCO3, Venous: 24 mmoL/L    Oxygen Saturation, Venous: 17 %    FIO2, Venous: 21    EXAM:  CT NECK SOFT TISSUE IC            PROCEDURE DATE:  12/23/2019            INTERPRETATION:  Clinical History / Reason for exam: Respiratory distress and desaturation.    TECHNIQUE:  CT neck with contrast. Multiple CT axial images of the neck obtained following administration of 100 cc Optiray-320 intravenous contrast with coronal and sagittal re-formations.      COMPARISON: None available    FINDINGS:    The visualized brain parenchyma is unremarkable.    The paranasal sinuses are well-aerated.  Mastoid air cells are well aerated.     Globes and orbits are unremarkable.    The parotid and submandibular glands are symmetric and normal in attenuation.      There is thickening of the vocal cords and aryepiglottic folds which can reflect edema/inflammation with apparent glottic/subglottic level severe airway narrowing, which can sometimes be a transient finding. Debris is also noted at the level of narrowing. The epiglottis appears unremarkable.    The thyroid gland is unremarkable.    The visualized lung apices demonstrate no focal consolidation.      The visualized osseous structures are unremarkable.    IMPRESSION:     Dr. Chaya Jacosb discussed preliminary findings with SUNG PERRIN PA on 12/23/2019 12:27 AM with readback.    Updated interpretation: Thickening of the vocal cords and aryepiglottic folds which can reflect edema/inflammation with apparent glottic/subglottic level severe airway narrowing - can sometimes be a transient finding but correlation with examinationis needed. Tracheal debris is noted immediately distal to the level of narrowing.              CHAYA JACOBS M.D., RESIDENT RADIOLOGIST  This document has been electronically signed.  PERI DICKSON M.D., ATTENDING RADIOLOGIST  This document has been electronically signed. Dec 23 2019  1:54AM

## 2019-12-23 NOTE — CONSULT NOTE ADULT - ATTENDING COMMENTS
Attending Statement: I have personally performed a face to face diagnostic evaluation on this patient. The patient is suffering from Acute respiratory failure - severe glottic/subglottic narrowing/swelling.  I have reviewed the above note and agree with the history, exam and suggestions for care, except as I have noted in the text.
above noted neck no mass/sridor pt fully examined by me and agree with above I was present in or during intubation

## 2019-12-23 NOTE — ED PROVIDER NOTE - PHYSICAL EXAMINATION
Physical Exam    Vital Signs: I have reviewed the initial vital signs.  Constitutional: well-nourished, appears stated age, +Severe Resp Distress, patient tripod position, nasal flaring, O2 SAT on RA 85% and on 3LNC 99%.   Eyes: Conjunctiva pink, Sclera clear, PERRLA, EOMI.  Throat: No swelling, no drooling + raspy voice no stridor  Cardiovascular: Tachy S1 and S2, regular rate, regular rhythm, well-perfused extremities, radial pulses equal and 2+  Respiratory: + labored respiratory effort, clear to auscultation bilaterally no wheezing, rales and rhonchi  Gastrointestinal: soft, non-tender abdomen, no pulsatile mass, normal bowl sounds  Musculoskeletal: supple neck, no lower extremity edema, no midline tenderness  Integumentary: warm, dry, no rash  Neurologic: awake, alert, cranial nerves II-XII grossly intact, extremities’ motor and sensory functions grossly intact  Psychiatric: appropriate mood, appropriate affect

## 2019-12-24 LAB
ANION GAP SERPL CALC-SCNC: 12 MMOL/L — SIGNIFICANT CHANGE UP (ref 7–14)
BASOPHILS # BLD AUTO: 0.01 K/UL — SIGNIFICANT CHANGE UP (ref 0–0.2)
BASOPHILS NFR BLD AUTO: 0.1 % — SIGNIFICANT CHANGE UP (ref 0–1)
BUN SERPL-MCNC: 8 MG/DL — LOW (ref 10–20)
CALCIUM SERPL-MCNC: 8.3 MG/DL — LOW (ref 8.5–10.1)
CHLORIDE SERPL-SCNC: 109 MMOL/L — SIGNIFICANT CHANGE UP (ref 98–110)
CO2 SERPL-SCNC: 19 MMOL/L — SIGNIFICANT CHANGE UP (ref 17–32)
CREAT SERPL-MCNC: 0.5 MG/DL — LOW (ref 0.7–1.5)
EOSINOPHIL # BLD AUTO: 0 K/UL — SIGNIFICANT CHANGE UP (ref 0–0.7)
EOSINOPHIL NFR BLD AUTO: 0 % — SIGNIFICANT CHANGE UP (ref 0–8)
GAS PNL BLDA: SIGNIFICANT CHANGE UP
GLUCOSE SERPL-MCNC: 117 MG/DL — HIGH (ref 70–99)
HCT VFR BLD CALC: 31.3 % — LOW (ref 37–47)
HGB BLD-MCNC: 10 G/DL — LOW (ref 12–16)
IMM GRANULOCYTES NFR BLD AUTO: 0.3 % — SIGNIFICANT CHANGE UP (ref 0.1–0.3)
LACTATE SERPL-SCNC: 1.3 MMOL/L — SIGNIFICANT CHANGE UP (ref 0.7–2)
LYMPHOCYTES # BLD AUTO: 1.33 K/UL — SIGNIFICANT CHANGE UP (ref 1.2–3.4)
LYMPHOCYTES # BLD AUTO: 17.3 % — LOW (ref 20.5–51.1)
MAGNESIUM SERPL-MCNC: 2 MG/DL — SIGNIFICANT CHANGE UP (ref 1.8–2.4)
MCHC RBC-ENTMCNC: 26.5 PG — LOW (ref 27–31)
MCHC RBC-ENTMCNC: 31.9 G/DL — LOW (ref 32–37)
MCV RBC AUTO: 82.8 FL — SIGNIFICANT CHANGE UP (ref 81–99)
MONOCYTES # BLD AUTO: 0.92 K/UL — HIGH (ref 0.1–0.6)
MONOCYTES NFR BLD AUTO: 12 % — HIGH (ref 1.7–9.3)
NEUTROPHILS # BLD AUTO: 5.4 K/UL — SIGNIFICANT CHANGE UP (ref 1.4–6.5)
NEUTROPHILS NFR BLD AUTO: 70.3 % — SIGNIFICANT CHANGE UP (ref 42.2–75.2)
NRBC # BLD: 0 /100 WBCS — SIGNIFICANT CHANGE UP (ref 0–0)
PLATELET # BLD AUTO: 178 K/UL — SIGNIFICANT CHANGE UP (ref 130–400)
POTASSIUM SERPL-MCNC: 4.1 MMOL/L — SIGNIFICANT CHANGE UP (ref 3.5–5)
POTASSIUM SERPL-SCNC: 4.1 MMOL/L — SIGNIFICANT CHANGE UP (ref 3.5–5)
RBC # BLD: 3.78 M/UL — LOW (ref 4.2–5.4)
RBC # FLD: 13.3 % — SIGNIFICANT CHANGE UP (ref 11.5–14.5)
SODIUM SERPL-SCNC: 140 MMOL/L — SIGNIFICANT CHANGE UP (ref 135–146)
WBC # BLD: 7.68 K/UL — SIGNIFICANT CHANGE UP (ref 4.8–10.8)
WBC # FLD AUTO: 7.68 K/UL — SIGNIFICANT CHANGE UP (ref 4.8–10.8)

## 2019-12-24 PROCEDURE — 99233 SBSQ HOSP IP/OBS HIGH 50: CPT

## 2019-12-24 PROCEDURE — 71045 X-RAY EXAM CHEST 1 VIEW: CPT | Mod: 26

## 2019-12-24 RX ORDER — IBUPROFEN 200 MG
400 TABLET ORAL EVERY 6 HOURS
Refills: 0 | Status: DISCONTINUED | OUTPATIENT
Start: 2019-12-24 | End: 2019-12-25

## 2019-12-24 RX ADMIN — CHLORHEXIDINE GLUCONATE 1 APPLICATION(S): 213 SOLUTION TOPICAL at 03:36

## 2019-12-24 RX ADMIN — Medication 400 MILLIGRAM(S): at 15:29

## 2019-12-24 RX ADMIN — ENOXAPARIN SODIUM 40 MILLIGRAM(S): 100 INJECTION SUBCUTANEOUS at 12:22

## 2019-12-24 RX ADMIN — CHLORHEXIDINE GLUCONATE 1 APPLICATION(S): 213 SOLUTION TOPICAL at 17:23

## 2019-12-24 RX ADMIN — Medication 50 MILLIGRAM(S): at 17:23

## 2019-12-24 RX ADMIN — FENTANYL CITRATE 2.38 MICROGRAM(S)/KG/HR: 50 INJECTION INTRAVENOUS at 01:11

## 2019-12-24 RX ADMIN — CEFTRIAXONE 100 MILLIGRAM(S): 500 INJECTION, POWDER, FOR SOLUTION INTRAMUSCULAR; INTRAVENOUS at 06:07

## 2019-12-24 RX ADMIN — PROPOFOL 2.86 MICROGRAM(S)/KG/MIN: 10 INJECTION, EMULSION INTRAVENOUS at 07:42

## 2019-12-24 RX ADMIN — Medication 60 MILLIGRAM(S): at 06:07

## 2019-12-24 RX ADMIN — PROPOFOL 2.86 MICROGRAM(S)/KG/MIN: 10 INJECTION, EMULSION INTRAVENOUS at 06:02

## 2019-12-24 RX ADMIN — FAMOTIDINE 20 MILLIGRAM(S): 10 INJECTION INTRAVENOUS at 12:22

## 2019-12-24 RX ADMIN — Medication 400 MILLIGRAM(S): at 14:29

## 2019-12-24 RX ADMIN — CHLORHEXIDINE GLUCONATE 15 MILLILITER(S): 213 SOLUTION TOPICAL at 06:07

## 2019-12-24 RX ADMIN — Medication 50 MILLIGRAM(S): at 06:08

## 2019-12-24 RX ADMIN — FENTANYL CITRATE 2.38 MICROGRAM(S)/KG/HR: 50 INJECTION INTRAVENOUS at 07:44

## 2019-12-24 RX ADMIN — SODIUM CHLORIDE 50 MILLILITER(S): 9 INJECTION INTRAMUSCULAR; INTRAVENOUS; SUBCUTANEOUS at 07:45

## 2019-12-24 RX ADMIN — Medication 60 MILLIGRAM(S): at 17:23

## 2019-12-24 NOTE — PROGRESS NOTE ADULT - ATTENDING COMMENTS
Attending Statement: I have personally performed a face to face diagnostic evaluation on this patient. The patient is suffering from  Acute respiratory failure - severe glottic/subglottic narrowing/swelling. I have reviewed the above note and agree with the history, exam and suggestions for care, except as I have noted in the text.

## 2019-12-24 NOTE — PROGRESS NOTE ADULT - ASSESSMENT
19 yr old healthy female presented to ER for acute onset of sob.    # Acute respiratory failure secondary to severe glottic/subglottic narrowing  - s/p intubation in ER   - SAT today - off sedation   - c/w NS@75  - c/w solumedrol 60mg q12  - c/w diphenhydramine  - urine toxicology: positive for Bz    # DVT Ppx  - c/w Lovenox    # GI ppx  - c/w famotidine    # NPO for now    # Bedrest     # Deposition  - from home    # Full code 19 yr old healthy female presented to ER for acute onset of sob.    # Acute respiratory failure secondary to severe glottic/subglottic narrowing  - s/p extubation 12/24/19  - c/w NS@75  - speech and swallow evaluation  - c/w solumedrol 60mg q12  - c/w diphenhydramine  - urine toxicology: positive for Bz  - d/c frias    # DVT Ppx  - c/w Lovenox    # GI ppx  - c/w famotidine    # Regular diet    # Bedrest     # Deposition  - from home    # Full code 19 yr old healthy female presented to ER for acute onset of sob.    # Acute respiratory failure secondary to severe glottic/subglottic narrowing  - s/p extubation 12/24/19  - c/w NS@75  - speech and swallow evaluation  - c/w solumedrol 60mg q12  - c/w diphenhydramine  - urine toxicology: positive for Bz  - d/c frias    # DVT Ppx  - c/w Lovenox    # GI ppx  - c/w famotidine    # Regular diet    # Bedrest     # Deposition  - from home  - anticipated for discharge tomorrow     # Full code 19 yr old healthy female presented to ER for acute onset of sob.    # Acute respiratory failure secondary to severe glottic/subglottic narrowing  - s/p extubation 12/24/19  - c/w NS@75  - speech and swallow evaluation  - c/w solumedrol 60mg q12  - c/w diphenhydramine  - urine toxicology: positive for Bz  - d/c frias    # DVT Ppx  - c/w Lovenox    # GI ppx  - c/w famotidine    # Regular diet    # Bedrest     # Deposition  - from home  - anticipated for discharge tomorrow     # Full code    Attending Attestation    Pt has been seen and examined. Case and Plan discussed at rounds and with pt at bedside.  Agree with above resident documentation as corrected.  Overall pt admitted for Subglottic/Epiglottic Edema s/p OR ETT exturbated this morning and doing well however she's not speaking this morning but able to type and communicate clearly through writing. She is AOx3 and happy she is doing better.     Plan: Observe in ICU today / Speech Pathology evaluation for swallowing assessment - If she passes start clear liquid diet (if okay with ICU Team) / c/w NS, IV Solumedrol / Pepcid and Benadryl as per orders. Will need Allergy Testing in outpt Clinic ([facility]) may need to be tested for C1 Esterase Deficiency but can be done as an outpt.     Dispo: Possible d/c home tomorrow

## 2019-12-24 NOTE — PROGRESS NOTE ADULT - SUBJECTIVE AND OBJECTIVE BOX
Patient is a 19y old  Female who presents with a chief complaint of Epiglottitis (24 Dec 2019 08:09)    Over Night Events: S/P extubation, doing well    ROS:     CONSTITUTIONAL:   no fever   no chills.  no weight gain   no weight loss    EYES:   no discharge,   no pain  no redness,   no visual changes.    ENT:   Ears: no ear pain and no hearing problems.  Nose: no nasal congestion and no nasal drainage.  Mouth/Throat: no dysphagia. Hoarseness and throat pain post extubation  Neck: no lumps, no pain, no stiffness and no swollen glands.     CARDIOVASCULAR:   no chest pain,   no swelling  no palpitaions  no syncope    RESPIRATORY:  no SOB,  no wheezing ,  no respiratory difficulty  no sputum production    GASTROINTESTINAL:   no abdominal pain,   no constipation,   no diarrhea,   no vomiting.    GENITOURINARY:  no dysuria,   no frequency,   no urgency  no hematuria.    MUSCULOSKELETAL:   no back pain,   no musculoskeletal pain,  no weakness.    SKIN:   no jaundice,   no lesions,   no pruritis,   no rashes.    NEURO:   no loss of consciousness,   no gait abnormality,   no headache,   no sensory deficits,   no weakness.    PSYCHIATRIC:   no known mental health issues  no anxiety  no depression    ALLERGIC/IMMUNOLOGIC:   No active allergic or immunologic issues        PHYSICAL EXAM    ICU Vital Signs Last 24 Hrs  T(C): 36.1 (24 Dec 2019 07:01), Max: 36.5 (23 Dec 2019 15:20)  T(F): 97 (24 Dec 2019 07:01), Max: 97.7 (23 Dec 2019 15:20)  HR: 95 (24 Dec 2019 09:45) (56 - 103)  BP: 107/66 (24 Dec 2019 09:45) (85/52 - 116/73)  BP(mean): 82 (24 Dec 2019 09:45) (63 - 90)  RR: 12 (24 Dec 2019 07:01) (12 - 20)  SpO2: 99% (24 Dec 2019 09:45) (99% - 100%)      CONSTITUTIONAL:  Well nourished.  NAD    ENT:   Airway patent,   Mouth with normal mucosa.   No thrush  No stridor    EYES:   Pupils equal,   Round and reactive to light.    CARDIAC:   Normal rate,   Regular rhythm.    No edema      Vascular:  Normal systolic impulse  No Carotid bruits    RESPIRATORY:   No wheezing  Bilateral BS  Normal chest expansion  Not tachypneic,  No use of accessory muscles    GASTROINTESTINAL:  Abdomen soft,   Non-tender,   No guarding,   + BS    GENITOURINARY  normal genitalia for sex  no edema    MUSCULOSKELETAL:   Range of motion is not limited,  No muscle or joint tenderness  No clubbing, cyanosis    NEUROLOGICAL:   Alert and oriented   No motor  deficits.  pertinent DTRs normal    SKIN:   Skin normal color for race,   Warm and dry and intact.   No evidence of rash.    PSYCHIATRIC:   Normal mood and affect.   No apparent risk to self or others.    HEME LYMPH:   No splenomegaly.  No cervical  lymphadenopathy.  no inguinal lymphadenopathy      12-23-19 @ 07:01  -  12-24-19 @ 07:00  --------------------------------------------------------  IN:    fentaNYL Infusion.: 550.4 mL    IV PiggyBack: 250 mL    propofol Infusion: 135 mL    sodium chloride 0.9%.: 1125 mL  Total IN: 2060.4 mL    OUT:    Indwelling Catheter - Urethral: 2290 mL  Total OUT: 2290 mL    Total NET: -229.6 mL      12-24-19 @ 07:01  -  12-24-19 @ 10:29  --------------------------------------------------------  IN:    fentaNYL Infusion.: 36 mL    propofol Infusion: 9 mL    sodium chloride 0.9%.: 200 mL  Total IN: 245 mL    OUT:    Indwelling Catheter - Urethral: 120 mL  Total OUT: 120 mL    Total NET: 125 mL    LABS:                       10.0   7.68  )-----------( 178      ( 24 Dec 2019 05:27 )             31.3                                               12-24    140  |  109  |  8<L>  ----------------------------<  117<H>  4.1   |  19  |  0.5<L>    Ca    8.3<L>      24 Dec 2019 05:27  Phos  2.6     12-23  Mg     2.0     12-24    TPro  7.0  /  Alb  4.6  /  TBili  0.3  /  DBili  x   /  AST  19  /  ALT  10<L>  /  AlkPhos  52  12-22                                                 CARDIAC MARKERS ( 22 Dec 2019 23:45 )  x     / <0.01 ng/mL / x     / x     / x                                                LIVER FUNCTIONS - ( 22 Dec 2019 23:45 )  Alb: 4.6 g/dL / Pro: 7.0 g/dL / ALK PHOS: 52 U/L / ALT: 10 U/L / AST: 19 U/L / GGT: x                                                        Mode: standby                                      ABG - ( 24 Dec 2019 04:08 )  pH, Arterial: 7.43  pH, Blood: x     /  pCO2: 31    /  pO2: 169   / HCO3: 21    / Base Excess: -2.9  /  SaO2: 100         MEDICATIONS  (STANDING):  cefTRIAXone   IVPB 1000 milliGRAM(s) IV Intermittent every 24 hours  chlorhexidine 0.12% Liquid 15 milliLiter(s) Oral Mucosa two times a day  chlorhexidine 4% Liquid 1 Application(s) Topical two times a day  diphenhydrAMINE   Injectable 50 milliGRAM(s) IV Push two times a day  enoxaparin Injectable 40 milliGRAM(s) SubCutaneous daily  famotidine Injectable 20 milliGRAM(s) IV Push daily  fentaNYL   Infusion. 0.5 MICROgram(s)/kG/Hr (2.38 mL/Hr) IV Continuous <Continuous>  methylPREDNISolone sodium succinate Injectable 60 milliGRAM(s) IV Push two times a day  propofol Infusion 10 MICROgram(s)/kG/Min (2.856 mL/Hr) IV Continuous <Continuous>  sodium chloride 0.9%. 1000 milliLiter(s) (50 mL/Hr) IV Continuous <Continuous>

## 2019-12-24 NOTE — PROGRESS NOTE ADULT - SUBJECTIVE AND OBJECTIVE BOX
SUBJECTIVE:    Patient is a 19y old Female who presents with a chief complaint of Epiglottitis (23 Dec 2019 10:55)    Currently admitted to medicine with the primary diagnosis of Epiglottitis     Today is hospital day 1d. This morning she is resting comfortably in bed and reports no new issues or overnight events.     PAST MEDICAL & SURGICAL HISTORY  No pertinent past medical history  No significant past surgical history    SOCIAL HISTORY:  Negative for smoking/alcohol/drug use.     ALLERGIES:  No Known Allergies    MEDICATIONS:  STANDING MEDICATIONS  cefTRIAXone   IVPB 1000 milliGRAM(s) IV Intermittent every 24 hours  chlorhexidine 0.12% Liquid 15 milliLiter(s) Oral Mucosa two times a day  chlorhexidine 4% Liquid 1 Application(s) Topical two times a day  diphenhydrAMINE   Injectable 50 milliGRAM(s) IV Push two times a day  enoxaparin Injectable 40 milliGRAM(s) SubCutaneous daily  famotidine Injectable 20 milliGRAM(s) IV Push daily  fentaNYL   Infusion. 0.5 MICROgram(s)/kG/Hr IV Continuous <Continuous>  methylPREDNISolone sodium succinate Injectable 60 milliGRAM(s) IV Push two times a day  propofol Infusion 10 MICROgram(s)/kG/Min IV Continuous <Continuous>  sodium chloride 0.9%. 1000 milliLiter(s) IV Continuous <Continuous>    PRN MEDICATIONS    VITALS:   T(F): 97  HR: 56  BP: 94/60  RR: 12  SpO2: 100%    LABS:                        10.0   7.68  )-----------( 178      ( 24 Dec 2019 05:27 )             31.3     12-24    140  |  109  |  8<L>  ----------------------------<  117<H>  4.1   |  19  |  0.5<L>    Ca    8.3<L>      24 Dec 2019 05:27  Phos  2.6     12-23  Mg     2.0     12-24    TPro  7.0  /  Alb  4.6  /  TBili  0.3  /  DBili  x   /  AST  19  /  ALT  10<L>  /  AlkPhos  52  12-22        ABG - ( 24 Dec 2019 04:08 )  pH, Arterial: 7.43  pH, Blood: x     /  pCO2: 31    /  pO2: 169   / HCO3: 21    / Base Excess: -2.9  /  SaO2: 100       Lactate, Blood: 1.3 mmol/L (12-24-19 @ 05:27)      CARDIAC MARKERS ( 22 Dec 2019 23:45 )  x     / <0.01 ng/mL / x     / x     / x          RADIOLOGY:    Xray Chest 1 View-PORTABLE IMMEDIATE (12.23.19)  No radiographic evidence of acute cardiopulmonary disease.    PHYSICAL EXAM:  GEN: No acute distress, intubated  LUNGS: Clear to auscultation bilaterally   HEART: S1/S2 present. RRR.   ABD: Soft, non-tender, non-distended. Bowel sounds present  EXT: NC/NC/NE/2+PP/LEACH  NEURO: AAOX3, off sedation SUBJECTIVE:    Patient is a 19y old Female who presents with a chief complaint of Epiglottitis (23 Dec 2019 10:55)    Currently admitted to medicine with the primary diagnosis of Epiglottitis     Today is hospital day 1d. This morning she is resting comfortably in bed and reports no new issues or overnight events.     PAST MEDICAL & SURGICAL HISTORY  No pertinent past medical history  No significant past surgical history    SOCIAL HISTORY:  Negative for smoking/alcohol/drug use.     ALLERGIES:  No Known Allergies    MEDICATIONS:  STANDING MEDICATIONS  cefTRIAXone   IVPB 1000 milliGRAM(s) IV Intermittent every 24 hours  chlorhexidine 0.12% Liquid 15 milliLiter(s) Oral Mucosa two times a day  chlorhexidine 4% Liquid 1 Application(s) Topical two times a day  diphenhydrAMINE   Injectable 50 milliGRAM(s) IV Push two times a day  enoxaparin Injectable 40 milliGRAM(s) SubCutaneous daily  famotidine Injectable 20 milliGRAM(s) IV Push daily  fentaNYL   Infusion. 0.5 MICROgram(s)/kG/Hr IV Continuous <Continuous>  methylPREDNISolone sodium succinate Injectable 60 milliGRAM(s) IV Push two times a day  propofol Infusion 10 MICROgram(s)/kG/Min IV Continuous <Continuous>  sodium chloride 0.9%. 1000 milliLiter(s) IV Continuous <Continuous>    PRN MEDICATIONS    VITALS:   T(F): 97  HR: 56  BP: 94/60  RR: 12  SpO2: 100%    LABS:                        10.0   7.68  )-----------( 178      ( 24 Dec 2019 05:27 )             31.3     12-24    140  |  109  |  8<L>  ----------------------------<  117<H>  4.1   |  19  |  0.5<L>    Ca    8.3<L>      24 Dec 2019 05:27  Phos  2.6     12-23  Mg     2.0     12-24    TPro  7.0  /  Alb  4.6  /  TBili  0.3  /  DBili  x   /  AST  19  /  ALT  10<L>  /  AlkPhos  52  12-22        ABG - ( 24 Dec 2019 04:08 )  pH, Arterial: 7.43  pH, Blood: x     /  pCO2: 31    /  pO2: 169   / HCO3: 21    / Base Excess: -2.9  /  SaO2: 100       Lactate, Blood: 1.3 mmol/L (12-24-19 @ 05:27)      CARDIAC MARKERS ( 22 Dec 2019 23:45 )  x     / <0.01 ng/mL / x     / x     / x          RADIOLOGY:    Xray Chest 1 View-PORTABLE IMMEDIATE (12.23.19)  No radiographic evidence of acute cardiopulmonary disease.    PHYSICAL EXAM:  GEN: No acute distress  LUNGS: Clear to auscultation bilaterally   HEART: S1/S2 present. RRR.   ABD: Soft, non-tender, non-distended. Bowel sounds present  EXT: NC/NC/NE/2+PP/LEACH  NEURO: AAOX3 SUBJECTIVE:    Patient is a 19y old Female who presents with a chief complaint of Epiglottitis (23 Dec 2019 10:55)    Currently admitted to medicine with the primary diagnosis of Epiglottitis     Today is hospital day 1d. This morning she is resting comfortably in bed and reports no new issues or overnight events.     PAST MEDICAL & SURGICAL HISTORY  No pertinent past medical history  No significant past surgical history    SOCIAL HISTORY:  Negative for smoking/alcohol/drug use.     ALLERGIES:  No Known Allergies    MEDICATIONS:  STANDING MEDICATIONS  cefTRIAXone   IVPB 1000 milliGRAM(s) IV Intermittent every 24 hours  chlorhexidine 0.12% Liquid 15 milliLiter(s) Oral Mucosa two times a day  chlorhexidine 4% Liquid 1 Application(s) Topical two times a day  diphenhydrAMINE   Injectable 50 milliGRAM(s) IV Push two times a day  enoxaparin Injectable 40 milliGRAM(s) SubCutaneous daily  famotidine Injectable 20 milliGRAM(s) IV Push daily  fentaNYL   Infusion. 0.5 MICROgram(s)/kG/Hr IV Continuous <Continuous>  methylPREDNISolone sodium succinate Injectable 60 milliGRAM(s) IV Push two times a day  propofol Infusion 10 MICROgram(s)/kG/Min IV Continuous <Continuous>  sodium chloride 0.9%. 1000 milliLiter(s) IV Continuous <Continuous>    VITALS:   T(C): 36.1 (24 Dec 2019 11:00), Max: 36.5 (23 Dec 2019 15:20)  T(F): 97 (24 Dec 2019 11:00), Max: 97.7 (23 Dec 2019 15:20)  HR: 95 (24 Dec 2019 09:45) (56 - 103)  BP: 107/66 (24 Dec 2019 09:45) (85/52 - 116/73)  BP(mean): 82 (24 Dec 2019 09:45) (63 - 90)  RR: 16 (24 Dec 2019 11:00) (12 - 20)  SpO2: 99% (24 Dec 2019 09:45) (99% - 100%)    LABS:                        10.0   7.68  )-----------( 178      ( 24 Dec 2019 05:27 )             31.3     12-24    140  |  109  |  8<L>  ----------------------------<  117<H>  4.1   |  19  |  0.5<L>    Ca    8.3<L>      24 Dec 2019 05:27  Phos  2.6     12-23  Mg     2.0     12-24    TPro  7.0  /  Alb  4.6  /  TBili  0.3  /  DBili  x   /  AST  19  /  ALT  10<L>  /  AlkPhos  52  12-22        ABG - ( 24 Dec 2019 04:08 )  pH, Arterial: 7.43  pH, Blood: x     /  pCO2: 31    /  pO2: 169   / HCO3: 21    / Base Excess: -2.9  /  SaO2: 100       Lactate, Blood: 1.3 mmol/L (12-24-19 @ 05:27)      CARDIAC MARKERS ( 22 Dec 2019 23:45 )  x     / <0.01 ng/mL / x     / x     / x          RADIOLOGY:    Xray Chest 1 View-PORTABLE IMMEDIATE (12.23.19)  No radiographic evidence of acute cardiopulmonary disease.    PHYSICAL EXAM:  GEN: No acute distress  LUNGS: Clear to auscultation bilaterally   HEART: S1/S2 present. RRR.   ABD: Soft, non-tender, non-distended. Bowel sounds present  EXT: NC/NC/NE/2+PP/LEACH  NEURO: AAOX3

## 2019-12-24 NOTE — PROGRESS NOTE ADULT - ASSESSMENT
IMPRESSION:  Acute respiratory failure - severe glottic/subglottic narrowing/swelling  no epiglottitis  S/P intubation in OR - extubated this morning  mild metabolic acidosis      SUGGEST:    CNS: Avoid any sedatives    HEENT: Oral care    PULMONARY:  HOB @ 45 degrees.  Extubated    CARDIOVASCULAR: monitor I/O    GI: GI prophylaxis.  Feeding     RENAL:  Follow up lytes.  Correct as needed  serum Osms    INFECTIOUS DISEASE: Follow up cultures  cont coverage for strep  viral panel    HEMATOLOGICAL:  DVT prophylaxis.    ENDOCRINE:  Follow up FS.  Insulin protocol if needed.    MUSCULOSKELETAL: OOB to chair in afternoon    Continue monitoring for any respiratory decompensation

## 2019-12-25 VITALS — OXYGEN SATURATION: 100 % | DIASTOLIC BLOOD PRESSURE: 67 MMHG | HEART RATE: 85 BPM | SYSTOLIC BLOOD PRESSURE: 102 MMHG

## 2019-12-25 LAB
ANION GAP SERPL CALC-SCNC: 8 MMOL/L — SIGNIFICANT CHANGE UP (ref 7–14)
BASOPHILS # BLD AUTO: 0.01 K/UL — SIGNIFICANT CHANGE UP (ref 0–0.2)
BASOPHILS NFR BLD AUTO: 0.1 % — SIGNIFICANT CHANGE UP (ref 0–1)
BUN SERPL-MCNC: 10 MG/DL — SIGNIFICANT CHANGE UP (ref 10–20)
CALCIUM SERPL-MCNC: 8.1 MG/DL — LOW (ref 8.5–10.1)
CHLORIDE SERPL-SCNC: 107 MMOL/L — SIGNIFICANT CHANGE UP (ref 98–110)
CO2 SERPL-SCNC: 25 MMOL/L — SIGNIFICANT CHANGE UP (ref 17–32)
CREAT SERPL-MCNC: 0.5 MG/DL — LOW (ref 0.7–1.5)
EOSINOPHIL # BLD AUTO: 0 K/UL — SIGNIFICANT CHANGE UP (ref 0–0.7)
EOSINOPHIL NFR BLD AUTO: 0 % — SIGNIFICANT CHANGE UP (ref 0–8)
GLUCOSE SERPL-MCNC: 133 MG/DL — HIGH (ref 70–99)
HCT VFR BLD CALC: 33.3 % — LOW (ref 37–47)
HGB BLD-MCNC: 10.7 G/DL — LOW (ref 12–16)
IMM GRANULOCYTES NFR BLD AUTO: 0.3 % — SIGNIFICANT CHANGE UP (ref 0.1–0.3)
LYMPHOCYTES # BLD AUTO: 1.7 K/UL — SIGNIFICANT CHANGE UP (ref 1.2–3.4)
LYMPHOCYTES # BLD AUTO: 21.6 % — SIGNIFICANT CHANGE UP (ref 20.5–51.1)
MAGNESIUM SERPL-MCNC: 2 MG/DL — SIGNIFICANT CHANGE UP (ref 1.8–2.4)
MCHC RBC-ENTMCNC: 26.6 PG — LOW (ref 27–31)
MCHC RBC-ENTMCNC: 32.1 G/DL — SIGNIFICANT CHANGE UP (ref 32–37)
MCV RBC AUTO: 82.8 FL — SIGNIFICANT CHANGE UP (ref 81–99)
MONOCYTES # BLD AUTO: 1 K/UL — HIGH (ref 0.1–0.6)
MONOCYTES NFR BLD AUTO: 12.7 % — HIGH (ref 1.7–9.3)
NEUTROPHILS # BLD AUTO: 5.15 K/UL — SIGNIFICANT CHANGE UP (ref 1.4–6.5)
NEUTROPHILS NFR BLD AUTO: 65.3 % — SIGNIFICANT CHANGE UP (ref 42.2–75.2)
NRBC # BLD: 0 /100 WBCS — SIGNIFICANT CHANGE UP (ref 0–0)
PLATELET # BLD AUTO: 197 K/UL — SIGNIFICANT CHANGE UP (ref 130–400)
POTASSIUM SERPL-MCNC: 4.2 MMOL/L — SIGNIFICANT CHANGE UP (ref 3.5–5)
POTASSIUM SERPL-SCNC: 4.2 MMOL/L — SIGNIFICANT CHANGE UP (ref 3.5–5)
RBC # BLD: 4.02 M/UL — LOW (ref 4.2–5.4)
RBC # FLD: 13.3 % — SIGNIFICANT CHANGE UP (ref 11.5–14.5)
SODIUM SERPL-SCNC: 140 MMOL/L — SIGNIFICANT CHANGE UP (ref 135–146)
WBC # BLD: 7.88 K/UL — SIGNIFICANT CHANGE UP (ref 4.8–10.8)
WBC # FLD AUTO: 7.88 K/UL — SIGNIFICANT CHANGE UP (ref 4.8–10.8)

## 2019-12-25 PROCEDURE — 99239 HOSP IP/OBS DSCHRG MGMT >30: CPT

## 2019-12-25 RX ORDER — PANTOPRAZOLE SODIUM 20 MG/1
1 TABLET, DELAYED RELEASE ORAL
Qty: 3 | Refills: 0
Start: 2019-12-25 | End: 2019-12-27

## 2019-12-25 RX ADMIN — CEFTRIAXONE 100 MILLIGRAM(S): 500 INJECTION, POWDER, FOR SOLUTION INTRAMUSCULAR; INTRAVENOUS at 05:42

## 2019-12-25 RX ADMIN — Medication 60 MILLIGRAM(S): at 05:42

## 2019-12-25 RX ADMIN — Medication 50 MILLIGRAM(S): at 05:44

## 2019-12-25 NOTE — PROGRESS NOTE ADULT - SUBJECTIVE AND OBJECTIVE BOX
Patient is tolerating oral diet, no complaints       T(F): 97.4 (12-25-19 @ 07:00), Max: 98.4 (12-24-19 @ 23:00)  HR: 85 (12-25-19 @ 09:10)  BP: 102/67 (12-25-19 @ 09:10)  RR: 18 (12-25-19 @ 07:00)  SpO2: 100% (12-25-19 @ 09:10) (98% - 100%)    PHYSICAL EXAM:  GENERAL: NAD  HEAD:  Atraumatic, Normocephalic  NERVOUS SYSTEM:  Alert & Oriented X3, no focal deficits   CHEST/LUNG: Clear to percussion bilaterally; No rales, rhonchi, wheezing, or rubs  HEART: Regular rate and rhythm; No murmurs, rubs, or gallops  ABDOMEN: Soft, Nontender, Nondistended; Bowel sounds present  EXTREMITIES:  2+ Peripheral Pulses, No clubbing, cyanosis, or edema  LYMPH: No lymphadenopathy noted  SKIN: No rashes or lesions    LABS  12-25    140  |  107  |  10  ----------------------------<  133<H>  4.2   |  25  |  0.5<L>    Ca    8.1<L>      25 Dec 2019 05:41  Mg     2.0     12-25                            10.7   7.88  )-----------( 197      ( 25 Dec 2019 05:41 )             33.3         CARDIAC ENZYMES      Troponin T, Serum: <0.01 ng/mL (12-22-19 @ 23:45)    RADIOLOGY  < from: CT Angio Chest w/ IV Cont (12.23.19 @ 00:27) >    PULMONARY EMBOLUS: No central or segmental pulmonary embolus.    TUBES/LINES: None.    LUNGS, PLEURA, AIRWAYS: No pneumothorax. No consolidation or pleural effusion. Central airways patent.    THORACIC NODES: No thoracic lymphadenopathy.    MEDIASTINUM/GREAT VESSELS: No pericardial effusion. Heart size unremarkable. No aneurysmal dilation of the thoracic aorta. 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    VISUALIZED UPPER ABDOMEN: Limited evaluation of the upper abdomen demonstrates no acute abnormality.    BONES/SOFT TISSUES: No acute osseous abnormality.    < end of copied text >  < from: CT Neck Soft Tissue w/ IV Cont (12.23.19 @ 00:27) >  Updated interpretation: Thickening of the vocal cords and aryepiglottic folds which can reflect edema/inflammation with apparent glottic/subglottic level severe airway narrowing - can sometimes be a transient finding but correlation with examinationis needed. Tracheal debris is noted immediately distal to the level of narrowing.    < end of copied text >    MEDICATIONS  (STANDING):  cefTRIAXone   IVPB 1000 milliGRAM(s) IV Intermittent every 24 hours  chlorhexidine 0.12% Liquid 15 milliLiter(s) Oral Mucosa two times a day  chlorhexidine 4% Liquid 1 Application(s) Topical two times a day  diphenhydrAMINE   Injectable 50 milliGRAM(s) IV Push two times a day  enoxaparin Injectable 40 milliGRAM(s) SubCutaneous daily  famotidine Injectable 20 milliGRAM(s) IV Push daily  methylPREDNISolone sodium succinate Injectable 60 milliGRAM(s) IV Push two times a day    MEDICATIONS  (PRN):  ibuprofen  Tablet. 400 milliGRAM(s) Oral every 6 hours PRN Moderate Pain (4 - 6)

## 2019-12-25 NOTE — DISCHARGE NOTE PROVIDER - NSDCCPCAREPLAN_GEN_ALL_CORE_FT
PRINCIPAL DISCHARGE DIAGNOSIS  Diagnosis: Vocal cord edema  Assessment and Plan of Treatment: Yoiu were found to have vocal cord edema complicated by acute respiratory failure for which you were intubated, and treated with antibiotics and prednisone. Please continue taking you antibiotics and steroids as prescribed. Follow-up with your pediatrician and ENT

## 2019-12-25 NOTE — DISCHARGE NOTE PROVIDER - CARE PROVIDERS DIRECT ADDRESSES
[email or web address],DirectAddress_Unknown

## 2019-12-25 NOTE — DISCHARGE NOTE PROVIDER - PROVIDER TOKENS
PROVIDER:[TOKEN:[[id number]],FOLLOWUP:[2 weeks]],PROVIDER:[TOKEN:[[id number]],FOLLOWUP:[2 weeks]]

## 2019-12-25 NOTE — DISCHARGE NOTE NURSING/CASE MANAGEMENT/SOCIAL WORK - PATIENT PORTAL LINK FT
You can access the FollowMyHealth Patient Portal offered by [facility] by registering at the following website: [email or web address]. By joining [employer]’s FollowMyHealth portal, you will also be able to view your health information using other applications (apps) compatible with our system.

## 2019-12-25 NOTE — DISCHARGE NOTE PROVIDER - CARE PROVIDER_API CALL
Sin Velasquez)  Otolaryngology  [address] [place] [address], [address] [place] [address]  Phone: [phone]  Fax: [phone]  Follow Up Time: 2 weeks    Torey Whitten)  Pediatrics  [address] [place] [address]  Phone: [phone]  Fax: [phone]  Follow Up Time: 2 weeks

## 2019-12-25 NOTE — DISCHARGE NOTE PROVIDER - NSDCMRMEDTOKEN_GEN_ALL_CORE_FT
amoxicillin-clavulanate 875 mg-125 mg oral tablet: 1 tab(s) orally 2 times a day   pantoprazole 40 mg oral delayed release tablet: 1 tab(s) orally once a day   predniSONE 10 mg oral tablet: 4 tab(s) orally once a day

## 2019-12-25 NOTE — PROGRESS NOTE ADULT - ASSESSMENT
19 yr old healthy female presented to ER for acute onset of sob.    # Acute respiratory failure secondary to severe glottic/subglottic narrowing  - resolved, s/p extubation 12/24/19  - tolerating regular oral diet  - may dc home today on oral antibiotics and prednisone  - outpt ENT consult for direct visualization of vocal cords   - urine toxicology: positive for Bz  -34min spent on dc

## 2019-12-26 NOTE — PROGRESS NOTE ADULT - SUBJECTIVE AND OBJECTIVE BOX
SUBJECTIVE:    Patient is a 19y old Female who presents with a chief complaint of sob (25 Dec 2019 11:06)    Currently admitted to medicine with the primary diagnosis of Vocal cord edema     Today is hospital day 2d. This morning she is resting comfortably in bed and reports no new issues or overnight events.     PAST MEDICAL & SURGICAL HISTORY  No pertinent past medical history  No significant past surgical history    SOCIAL HISTORY:  Negative for smoking/alcohol/drug use.     ALLERGIES:  No Known Allergies    MEDICATIONS:  STANDING MEDICATIONS    PRN MEDICATIONS    VITALS:   T(F): --  HR: 85  BP: 102/67  RR: 18  SpO2: 100%    LABS:                        10.7   7.88  )-----------( 197      ( 25 Dec 2019 05:41 )             33.3     12-25    140  |  107  |  10  ----------------------------<  133<H>  4.2   |  25  |  0.5<L>    Ca    8.1<L>      25 Dec 2019 05:41  Mg     2.0     12-25      PHYSICAL EXAM:  GEN: No acute distress  LUNGS: Clear to auscultation bilaterally   HEART: S1/S2 present. RRR.   ABD: Soft, non-tender, non-distended. Bowel sounds present  EXT: NC/NC/NE/2+PP/LEACH  NEURO: AAOX3

## 2019-12-26 NOTE — PROGRESS NOTE ADULT - ASSESSMENT
19 yr old healthy female presented to ER for acute onset of sob.    # Acute respiratory failure secondary to severe glottic/subglottic narrowing  - s/p extubation 12/24/19  - ENT outpt follow up  - urine toxicology: positive for Bz    # DVT Ppx  - c/w Lovenox    # Regular diet    # ambulate as tolerated    # Deposition  - discharge today to home    # Full code

## 2019-12-31 DIAGNOSIS — J96.00 ACUTE RESPIRATORY FAILURE, UNSPECIFIED WHETHER WITH HYPOXIA OR HYPERCAPNIA: ICD-10-CM

## 2019-12-31 DIAGNOSIS — J38.4 EDEMA OF LARYNX: ICD-10-CM

## 2019-12-31 DIAGNOSIS — R82.5 ELEVATED URINE LEVELS OF DRUGS, MEDICAMENTS AND BIOLOGICAL SUBSTANCES: ICD-10-CM

## 2019-12-31 DIAGNOSIS — E87.2 ACIDOSIS: ICD-10-CM

## 2019-12-31 DIAGNOSIS — J05.11 ACUTE EPIGLOTTITIS WITH OBSTRUCTION: ICD-10-CM

## 2021-07-04 NOTE — DISCHARGE NOTE PROVIDER - HOSPITAL COURSE
HPI:    20yo F w/ no PMH p/w SOB that started 2 hours PTA while driving. + low grade fever and non-productive cough x 2 days. Pt reported having worsening SOB and cough while driving to a point that she couldn't breath. In ED, pt was noted to SOB w/ hoarse voice and tripoding. Pt reported to have acute epiglottitis as confirmed on CXR and CT neck w/ IV contrast. Pt was intubated for airway protection. She was extubated on 12/24/19. Pt was also treated with antibiotics and steroids. No HPI:    20yo F w/ no PMH p/w SOB that started 2 hours PTA while driving. + low grade fever and non-productive cough x 2 days. Pt reported having worsening SOB and cough while driving to a point that she couldn't breath. In ED, pt was noted to SOB w/ hoarse voice and tripoding. Pt reported to have thickening of the vocal cords and aryepiglottic folds which can reflect edema/inflammation with apparent glottic/subglottic level severe airway narrowing. Pt was intubated for airway protection. She was extubated on 12/24/19. Pt was also treated with antibiotics and steroids.

## 2025-07-07 ENCOUNTER — APPOINTMENT (OUTPATIENT)
Dept: OBGYN | Facility: CLINIC | Age: 25
End: 2025-07-07

## 2025-07-14 ENCOUNTER — APPOINTMENT (OUTPATIENT)
Dept: OBGYN | Facility: CLINIC | Age: 25
End: 2025-07-14